# Patient Record
(demographics unavailable — no encounter records)

---

## 2024-10-07 NOTE — HISTORY OF PRESENT ILLNESS
[FreeTextEntry1] : Judson is a 49yoM with PMHx: HLD on statins, nonobstructive CAD by CT (2024) and asthma.   He presents for interval follow up today to review results of testing. Echo 9/30/24 EF 60%, mild MR, PASP 22mmHg. Mild TR. Trace IN. Normal aortic dimensions.  ETT 9/30/24 Exercised 14 mins 59 sec on Bennett protocol. 15.1 METS. Peak /64. Negative for ischemia. PVCs at peak exercise.  These results were reviewed with him in detail.  He remains asymptomatic for CAD with no exertional sx.  There is no exertional chest pain, pressure or discomfort. There is no significant dyspnea on exertion or orthopnea. There are no symptomatic palpitations, lightheadedness, dizziness or syncope.   BP well controlled at: 110/70.  Review of labs from Jan 2024: LDL 88, Trigs 79, HDL 61. Cr 0.89, normal LFTs, K 4.6, normal CBC.

## 2024-10-07 NOTE — DISCUSSION/SUMMARY
[FreeTextEntry1] : YIN WEBB is a 49 year old M who presents today Oct 07, 2024 with the above history and the following active issues:   HLD: LDL was 88 in January 2024. Continue Rosuvastatin.  CAD by CT: Exercised 14 mins 59 sec on Bennett protocol. Negative for ischemia. No exertional sx.  On ASA.   Asthma: Stable. Ongoing f/u with PCP.   BPH: on Tamsulosin, ongoing f/u with PCP.  F/u 8 months or sooner PRN.  Limitations of non-invasive testing reviewed. Red flag symptoms which would warrant sooner emergent evaluation reviewed with the patient. All questions and concerns were addressed and answered.  Sincerely,   Kenisha Rose, [facility] Patient's history, testing, and plan reviewed with supervising MD: Dr. Joe Parmar

## 2024-10-07 NOTE — PHYSICAL EXAM
[Well Developed] : well developed [No Carotid Bruit] : no carotid bruit [Normal S1, S2] : normal S1, S2 [No Murmur] : no murmur [Clear Lung Fields] : clear lung fields [Good Air Entry] : good air entry [No Edema] : no edema [Alert and Oriented] : alert and oriented

## 2025-07-07 NOTE — IMAGING
[de-identified] : (Exam: Shoulder)   Laterality is right    Patient is in no acute distress, alert and oriented Sensation is grossly intact to light touch in the hand Motor function is 5/5 in the hand Capillary refill is less than 2 seconds in the fingers Lymphadenopathy is not present Peripheral edema is not present   Skin is intact Swelling is not present Atrophy is not present Scapular winging is not present Deformity of the AC joint is not present Deformity of the biceps is not present   Bicipital groove tenderness is present AC joint tenderness is not present Scapulothoracic tenderness is not present   Active forward elevation is 150 Passive forward elevation is 170 External rotation at the side is 60 Internal rotation behind the back is to the level of T12   Forward elevation strength is 4/5 External rotation strength at the side is 4/5 Internal rotation strength at the side is 5/5 Deltoid strength of anterior, posterior and lateral heads is 5/5   Haddad test is abnormal OBriens test is abnormal Empty can test is abnormal Speeds test is abnormal Cross body adduction test is normal Belly press test is normal Apprehension and relocation is normal Sulcus sign is normal  [Right] : right shoulder [There are no fractures, subluxations or dislocations. No significant abnormalities are seen] : There are no fractures, subluxations or dislocations. No significant abnormalities are seen

## 2025-07-07 NOTE — DISCUSSION/SUMMARY
[de-identified] : (Impression) -right shoulder rotator cuff partial tear  The diagnosis was explained in detail. The potential non-surgical and surgical treatments were reviewed. The relative risks and benefits of each option were considered relative to the patient age, activity level, medical history, symptom severity and previously attempted treatments.  The patient was advised to consult with their primary medical provider prior to initiation of any new medications to reduce the risk of adverse effects specific to their long-term home medications and medical history. The risk of gastrointestinal irritation and kidney injury specific to long-term NSAID use was discussed.    (Plan)  -Physical therapy recommended for stretching and strengthening. -Cortisone injection is deferred at this time. -Meloxicam for pain and inflammation, take as needed. -MRI is deferred at this time. -Follow up in 6 to 8 weeks. If symptoms persist consider CSI vs MRI.     (MDM) Problem Complexity -Moderate: chronic illness with exacerbation   Risk -Moderate: prescription medication   Visit Type -New: Patient has not been seen by another provider in my practice within the past 2 years who specializes in orthopedic surgery.

## 2025-07-07 NOTE — HISTORY OF PRESENT ILLNESS
[de-identified] : Date of initial evaluation: 07/07/2025 Patient age: 50 year Body part causing symptoms: right shoulder Location of the pain: lateral Pain score today: 6/10 Duration of symptoms: 1 week History of injury: none Activities that worsen the pain: lifting up arm, sleeping, reaching Radicular symptoms: none Medications attempted for this problem: Advil PT for this problem: none Injections for this problem: none Previous surgery on this body part: none Prior imaging: none Occupation: